# Patient Record
Sex: FEMALE
[De-identification: names, ages, dates, MRNs, and addresses within clinical notes are randomized per-mention and may not be internally consistent; named-entity substitution may affect disease eponyms.]

---

## 2019-06-24 ENCOUNTER — APPOINTMENT (OUTPATIENT)
Dept: HEART AND VASCULAR | Facility: CLINIC | Age: 74
End: 2019-06-24

## 2019-06-25 PROBLEM — Z00.00 ENCOUNTER FOR PREVENTIVE HEALTH EXAMINATION: Status: ACTIVE | Noted: 2019-06-25

## 2019-07-08 ENCOUNTER — APPOINTMENT (OUTPATIENT)
Dept: HEART AND VASCULAR | Facility: CLINIC | Age: 74
End: 2019-07-08
Payer: MEDICARE

## 2019-07-08 VITALS — HEART RATE: 70 BPM | SYSTOLIC BLOOD PRESSURE: 125 MMHG | DIASTOLIC BLOOD PRESSURE: 64 MMHG

## 2019-07-08 PROCEDURE — 99205 OFFICE O/P NEW HI 60 MIN: CPT

## 2019-07-08 NOTE — ASSESSMENT
[FreeTextEntry1] : 73 yo F with PMH HTN, HLD, DM here for first evaluation of dyspnea on exertion. \par \par SANTAMARIA\par -In the setting of risk factors, will rule out for CAD with a nuclear stress test (pharmacological)\par -We'll also obtain an echocardiogram to rule out wall motion abnormalities\par \par CHEST PAIN\par -As above, the patient has risk factors for CAD \par -ECHO and a nuclear stress\par \par HTN\par -Well controlled today\par - continue with current medications\par \par HLD\par -fasting lipid panel prior to next visit\par \par Followup in 2 weeks for echo and nuclear stress test results

## 2019-07-08 NOTE — PHYSICAL EXAM
[General Appearance - Well Developed] : well developed [Normal Appearance] : normal appearance [No Deformities] : no deformities [General Appearance - Well Nourished] : well nourished [Well Groomed] : well groomed [FreeTextEntry1] : walks with a cane [General Appearance - In No Acute Distress] : no acute distress [Heart Rate And Rhythm] : heart rate and rhythm were normal [Heart Sounds] : normal S1 and S2 [Murmurs] : no murmurs present [Exaggerated Use Of Accessory Muscles For Inspiration] : no accessory muscle use [Respiration, Rhythm And Depth] : normal respiratory rhythm and effort [Auscultation Breath Sounds / Voice Sounds] : lungs were clear to auscultation bilaterally [Abdomen Tenderness] : non-tender [Abdomen Soft] : soft [Cyanosis, Localized] : no localized cyanosis [Nail Clubbing] : no clubbing of the fingernails [Abdomen Mass (___ Cm)] : no abdominal mass palpated [Petechial Hemorrhages (___cm)] : no petechial hemorrhages [] : no ischemic changes

## 2019-07-08 NOTE — HISTORY OF PRESENT ILLNESS
[FreeTextEntry1] : REFERRING: Dr. Oscar Callahan. \par \par 73 yo F with PMH HTN, HLD, DM here for first evaluation of dyspnea on exertion. \par The patient reports episode of dyspnea on exertion for the last 2 months. She reports she can walk about 2 blocks limited by shortness of breath. She is also complaining of occasional chest pain when walking. He uses a cane to walk because of lower back pain.\par She is compliant with her medications.\par \par \par PMH\par as above\par \par \par ALL\par NKDA\par \par MEDICATIONS\par amlodipine 2.5 mg daily \par atorvsatatin 40 mg daily \par losartan-hctz 100-25 mg daily \par gipizide\par omeprazole\par \par \par \par \par SH\par no smoke, no etoh, no drugs

## 2019-07-22 ENCOUNTER — APPOINTMENT (OUTPATIENT)
Dept: HEART AND VASCULAR | Facility: CLINIC | Age: 74
End: 2019-07-22
Payer: MEDICARE

## 2019-07-22 VITALS — HEART RATE: 75 BPM | SYSTOLIC BLOOD PRESSURE: 140 MMHG | DIASTOLIC BLOOD PRESSURE: 70 MMHG

## 2019-07-22 PROCEDURE — 99215 OFFICE O/P EST HI 40 MIN: CPT

## 2019-07-22 NOTE — ASSESSMENT
[FreeTextEntry1] : 73 yo F with PMH HTN, HLD, DM here for follow up evaluation of dyspnea on exertion. \par \par SANTAMARIA\par -echocardiogram is wnl  \par -nuclear stress test negative for ischemia with normal LVEF\par -baseline EKG is wnl\par -Recommend better blood pressure control as possible contributor to SANTAMARIA \par -recommended to continue workup for non-cardiac etiologies of dyspnea on exertion\par \par HTN\par -not optimally controlled today and the patient reports uncontrol BP at home\par -will up titrate amlodipine from 2.5 mg to 5 mg daily \par -recc to check BP at home and report BP > 140/90 or any symptoms of orthostasis\par - continue with rest of medications\par \par HLD\par -fasting lipid panel reviewed \par -cont with atorvastatin 40 mg daily\par -cont with healthy diet and exercise \par \par Followup in 8  weeks for BP and symptom assessment

## 2019-07-22 NOTE — PHYSICAL EXAM
[General Appearance - Well Developed] : well developed [Normal Appearance] : normal appearance [Well Groomed] : well groomed [General Appearance - Well Nourished] : well nourished [No Deformities] : no deformities [General Appearance - In No Acute Distress] : no acute distress [Heart Rate And Rhythm] : heart rate and rhythm were normal [Heart Sounds] : normal S1 and S2 [Murmurs] : no murmurs present [Respiration, Rhythm And Depth] : normal respiratory rhythm and effort [Exaggerated Use Of Accessory Muscles For Inspiration] : no accessory muscle use [Auscultation Breath Sounds / Voice Sounds] : lungs were clear to auscultation bilaterally [Abdomen Soft] : soft [Abdomen Tenderness] : non-tender [Abdomen Mass (___ Cm)] : no abdominal mass palpated [Nail Clubbing] : no clubbing of the fingernails [Cyanosis, Localized] : no localized cyanosis [Petechial Hemorrhages (___cm)] : no petechial hemorrhages [] : no ischemic changes [FreeTextEntry1] : walks with a cane

## 2019-07-22 NOTE — HISTORY OF PRESENT ILLNESS
[FreeTextEntry1] : REFERRING MD: Dr. Oscar Callahan. \par \par 73 yo F with PMH HTN, HLD, DM here for follow up  evaluation of dyspnea on exertion and stress test results \par The patient reports episodes of dyspnea on exertion as described in the previous note below.\par She reports complicance with her medications and reports checking her BP at home with systolic BP  140/150 systolic.\par \par \par As per previous note on 7/8/2019: \par 73 yo F with PMH HTN, HLD, DM here for first evaluation of dyspnea on exertion. \par The patient reports episode of dyspnea on exertion for the last 2 months. She reports she can walk about 2 blocks limited by shortness of breath. She is also complaining of occasional chest pain when walking. She uses a cane to walk because of lower back pain.\par She is compliant with her medications.\par \par \par PMH\par as above\par \par \par ALL\par NKDA\par \par MEDICATIONS\par amlodipine 2.5 mg daily \par astrotactin 40 mg daily \par losartan-hctz 100-25 mg daily \par gipizide\par omeprazole\par \par \par SH\par no smoke, no etoh, no drugs\par \par \par Nuclear stress test 7/12/2019:\par No stress-induced cardiomyopathy, LVEF= 62%\par \par Echocardiogram 7/11/2019\par Left ventricular size and function, normal right ventricular size and function\par \par Carotid duplex 7/12/2019\par Mild plaque with no evidence of significant stenosis\par \par EKG 7/8/2019\par SR, wnl\par \par Lipid panel 7/13/2019\par Cholesterol 154\par Triglyceride 101\par LDL 93\par HDL 41\par \par creatinine 1.0

## 2019-09-16 ENCOUNTER — APPOINTMENT (OUTPATIENT)
Dept: HEART AND VASCULAR | Facility: CLINIC | Age: 74
End: 2019-09-16

## 2019-10-21 ENCOUNTER — APPOINTMENT (OUTPATIENT)
Dept: HEART AND VASCULAR | Facility: CLINIC | Age: 74
End: 2019-10-21

## 2019-10-28 ENCOUNTER — APPOINTMENT (OUTPATIENT)
Dept: HEART AND VASCULAR | Facility: CLINIC | Age: 74
End: 2019-10-28
Payer: MEDICARE

## 2019-10-28 VITALS — DIASTOLIC BLOOD PRESSURE: 70 MMHG | HEART RATE: 67 BPM | SYSTOLIC BLOOD PRESSURE: 130 MMHG

## 2019-10-28 PROCEDURE — 99215 OFFICE O/P EST HI 40 MIN: CPT

## 2019-10-28 NOTE — PHYSICAL EXAM
[General Appearance - Well Developed] : well developed [Normal Appearance] : normal appearance [Well Groomed] : well groomed [General Appearance - Well Nourished] : well nourished [No Deformities] : no deformities [General Appearance - In No Acute Distress] : no acute distress [FreeTextEntry1] : walks with a cane [Heart Rate And Rhythm] : heart rate and rhythm were normal [Heart Sounds] : normal S1 and S2 [Murmurs] : no murmurs present [Respiration, Rhythm And Depth] : normal respiratory rhythm and effort [Exaggerated Use Of Accessory Muscles For Inspiration] : no accessory muscle use [Auscultation Breath Sounds / Voice Sounds] : lungs were clear to auscultation bilaterally [Abdomen Soft] : soft [Abdomen Tenderness] : non-tender [Abdomen Mass (___ Cm)] : no abdominal mass palpated [Nail Clubbing] : no clubbing of the fingernails [Cyanosis, Localized] : no localized cyanosis [Petechial Hemorrhages (___cm)] : no petechial hemorrhages [] : no ischemic changes

## 2019-10-28 NOTE — ASSESSMENT
[FreeTextEntry1] : 75 yo F with PMH HTN, HLD, DM here for follow.  \par \par SANTAMARIA\par -improved, the patient reports lower back pain and mostly generalized fatigue\par -echocardiogram is wnl  \par -nuclear stress test negative for ischemia with normal LVEF\par -baseline EKG is wnl\par -recommended to continue workup for non-cardiac etiologies of dyspnea on exertion\par \par HTN\par -cont with amlodipine 5 mg daily \par -continue with losartan-hctz 100-25\par -recc to check BP at home and report BP > 140/90 or any symptoms of orthostasis\par - continue with rest of medications\par \par HLD\par -fasting lipid panel reviewed \par -cont with atorvastatin 40 mg daily\par -cont with healthy diet and exercise \par \par Follow up in 3 months or sooner if any symptoms

## 2019-10-28 NOTE — HISTORY OF PRESENT ILLNESS
[FreeTextEntry1] : 73 yo F with PMH HTN, HLD, DM here for follow up  evaluation. \par The patient reports feeling better since the last visit. \par She reports improvement of her sob and of her SANTAMARIA. \par She walks with a cane 2/2 lower back pain. \par Denies chest pain, sob, palpitations, syncope, or presyncope. \par \par \par \par \par As per previous note on 7.22.2019\par REFERRING MD: Dr. Oscar Callahan. \par \par 73 yo F with PMH HTN, HLD, DM here for follow up  evaluation of dyspnea on exertion and stress test results \par The patient reports episodes of dyspnea on exertion as described in the previous note below.\par She reports complicance with her medications and reports checking her BP at home with systolic BP  140/150 systolic.\par \par \par As per previous note on 7/8/2019: \par 73 yo F with PMH HTN, HLD, DM here for first evaluation of dyspnea on exertion. \par The patient reports episode of dyspnea on exertion for the last 2 months. She reports she can walk about 2 blocks limited by shortness of breath. She is also complaining of occasional chest pain when walking. She uses a cane to walk because of lower back pain.\par She is compliant with her medications.\par \par \par PMH\par as above\par \par \par ALL\par NKDA\par \par MEDICATIONS\par amlodipine 2.5 mg daily \par atorvastatin 40 mg daily \par losartan-hctz 100-25 mg daily \par gipizide\par omeprazole\par \par \par SH\par no smoke, no etoh, no drugs\par \par \par Nuclear stress test 7/12/2019:\par No stress-induced cardiomyopathy, LVEF= 62%\par \par Echocardiogram 7/11/2019\par Left ventricular size and function, normal right ventricular size and function\par \par Carotid duplex 7/12/2019\par Mild plaque with no evidence of significant stenosis\par \par EKG 7/8/2019\par SR, wnl\par \par EKG 10.28.2019\par SR, wnl\par \par Lipid panel 7/13/2019\par Cholesterol 154\par Triglyceride 101\par LDL 93\par HDL 41\par creatinine 1.0\par \par

## 2021-02-03 ENCOUNTER — HOSPITAL ENCOUNTER (OUTPATIENT)
Dept: HOSPITAL 74 - JRADIR | Age: 76
Discharge: HOME | End: 2021-02-03
Attending: INTERNAL MEDICINE
Payer: COMMERCIAL

## 2021-02-03 DIAGNOSIS — E04.1: Primary | ICD-10-CM

## 2021-02-03 PROCEDURE — 0G9K3ZX DRAINAGE OF THYROID GLAND, PERCUTANEOUS APPROACH, DIAGNOSTIC: ICD-10-PCS | Performed by: RADIOLOGY

## 2022-07-13 ENCOUNTER — HOSPITAL ENCOUNTER (EMERGENCY)
Dept: HOSPITAL 74 - JER | Age: 77
Discharge: HOME | End: 2022-07-13
Payer: COMMERCIAL

## 2022-07-13 VITALS — DIASTOLIC BLOOD PRESSURE: 71 MMHG | HEART RATE: 75 BPM | TEMPERATURE: 98.1 F | SYSTOLIC BLOOD PRESSURE: 134 MMHG

## 2022-07-13 VITALS — BODY MASS INDEX: 26.6 KG/M2

## 2022-07-13 DIAGNOSIS — U07.1: Primary | ICD-10-CM

## 2022-07-13 PROCEDURE — 3E033GC INTRODUCTION OF OTHER THERAPEUTIC SUBSTANCE INTO PERIPHERAL VEIN, PERCUTANEOUS APPROACH: ICD-10-PCS

## 2022-07-13 PROCEDURE — M0222: HCPCS
